# Patient Record
Sex: MALE | Race: WHITE | NOT HISPANIC OR LATINO | Employment: OTHER | URBAN - METROPOLITAN AREA
[De-identification: names, ages, dates, MRNs, and addresses within clinical notes are randomized per-mention and may not be internally consistent; named-entity substitution may affect disease eponyms.]

---

## 2022-07-29 ENCOUNTER — OFFICE VISIT (OUTPATIENT)
Dept: FAMILY MEDICINE CLINIC | Facility: CLINIC | Age: 27
End: 2022-07-29

## 2022-07-29 VITALS
HEART RATE: 60 BPM | BODY MASS INDEX: 38.34 KG/M2 | HEIGHT: 68 IN | RESPIRATION RATE: 22 BRPM | DIASTOLIC BLOOD PRESSURE: 84 MMHG | SYSTOLIC BLOOD PRESSURE: 130 MMHG | WEIGHT: 253 LBS | OXYGEN SATURATION: 96 % | TEMPERATURE: 98 F

## 2022-07-29 DIAGNOSIS — S46.919A MUSCLE STRAIN OF SHOULDER REGION, UNSPECIFIED LATERALITY, INITIAL ENCOUNTER: ICD-10-CM

## 2022-07-29 DIAGNOSIS — R41.89 BRAIN FOG: ICD-10-CM

## 2022-07-29 DIAGNOSIS — M54.2 CERVICAL PAIN (NECK): ICD-10-CM

## 2022-07-29 DIAGNOSIS — Z87.828 HISTORY OF MOTOR VEHICLE ACCIDENT: ICD-10-CM

## 2022-07-29 DIAGNOSIS — R41.3 MEMORY CHANGES: ICD-10-CM

## 2022-07-29 DIAGNOSIS — Z00.00 ENCOUNTER FOR MEDICAL EXAMINATION TO ESTABLISH CARE: Primary | ICD-10-CM

## 2022-07-29 PROCEDURE — 99385 PREV VISIT NEW AGE 18-39: CPT | Performed by: NURSE PRACTITIONER

## 2022-07-29 RX ORDER — BACLOFEN 10 MG/1
10 TABLET ORAL NIGHTLY PRN
Qty: 30 TABLET | Refills: 1 | Status: SHIPPED | OUTPATIENT
Start: 2022-07-29 | End: 2022-10-18

## 2022-07-29 NOTE — ASSESSMENT & PLAN NOTE
Discussed healthy diet low in carbohydrates, fat, cholesterol and sugar and exercise at least 3-4 times weekly for 30 minutes
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
## 2022-08-01 ENCOUNTER — TELEPHONE (OUTPATIENT)
Dept: FAMILY MEDICINE CLINIC | Facility: CLINIC | Age: 27
End: 2022-08-01

## 2022-08-01 NOTE — TELEPHONE ENCOUNTER
----- Message from HANDY Steinberg sent at 7/29/2022  6:08 PM EDT -----  X-ray of cervical spine only showing mild cervicothoracic scoliosis. We can refer to physical therapy if patient is agreeable I will send referral.

## 2022-08-01 NOTE — TELEPHONE ENCOUNTER
Spoke with the patient and advised X-ray of cervical spine only showing mild cervicothoracic scoliosis. We can refer to physical therapy if patient is agreeable I will send referral.      Pt verbalizes understanding and would like to proceed with the Pt referral.

## 2022-08-02 DIAGNOSIS — Z87.828 HISTORY OF MOTOR VEHICLE ACCIDENT: ICD-10-CM

## 2022-08-02 DIAGNOSIS — S46.919A MUSCLE STRAIN OF SHOULDER REGION, UNSPECIFIED LATERALITY, INITIAL ENCOUNTER: ICD-10-CM

## 2022-08-02 DIAGNOSIS — M54.2 CERVICAL PAIN (NECK): Primary | ICD-10-CM

## 2022-08-09 ENCOUNTER — TREATMENT (OUTPATIENT)
Dept: PHYSICAL THERAPY | Facility: CLINIC | Age: 27
End: 2022-08-09

## 2022-08-09 DIAGNOSIS — M54.2 PAIN, NECK: Primary | ICD-10-CM

## 2022-08-09 DIAGNOSIS — G44.86 CERVICOGENIC HEADACHE: ICD-10-CM

## 2022-08-09 PROCEDURE — 97140 MANUAL THERAPY 1/> REGIONS: CPT | Performed by: PHYSICAL THERAPIST

## 2022-08-09 PROCEDURE — 97110 THERAPEUTIC EXERCISES: CPT | Performed by: PHYSICAL THERAPIST

## 2022-08-09 PROCEDURE — 97161 PT EVAL LOW COMPLEX 20 MIN: CPT | Performed by: PHYSICAL THERAPIST
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
## 2022-08-12 ENCOUNTER — LAB (OUTPATIENT)
Dept: LAB | Facility: HOSPITAL | Age: 27
End: 2022-08-12

## 2022-08-12 ENCOUNTER — OFFICE VISIT (OUTPATIENT)
Dept: NEUROLOGY | Facility: CLINIC | Age: 27
End: 2022-08-12

## 2022-08-12 VITALS
DIASTOLIC BLOOD PRESSURE: 76 MMHG | HEART RATE: 62 BPM | SYSTOLIC BLOOD PRESSURE: 130 MMHG | OXYGEN SATURATION: 96 % | RESPIRATION RATE: 18 BRPM

## 2022-08-12 DIAGNOSIS — F07.81 POST CONCUSSION SYNDROME: ICD-10-CM

## 2022-08-12 DIAGNOSIS — G44.89 OTHER HEADACHE SYNDROME: Primary | ICD-10-CM

## 2022-08-12 DIAGNOSIS — R41.3 MEMORY LOSS: ICD-10-CM

## 2022-08-12 DIAGNOSIS — M54.2 NECK PAIN: ICD-10-CM

## 2022-08-12 LAB
ALBUMIN SERPL-MCNC: 4.6 G/DL (ref 3.5–5.2)
ALBUMIN/GLOB SERPL: 1.5 G/DL
ALP SERPL-CCNC: 84 U/L (ref 39–117)
ALT SERPL W P-5'-P-CCNC: 24 U/L (ref 1–41)
ANION GAP SERPL CALCULATED.3IONS-SCNC: 11.1 MMOL/L (ref 5–15)
AST SERPL-CCNC: 23 U/L (ref 1–40)
BASOPHILS # BLD AUTO: 0.04 10*3/MM3 (ref 0–0.2)
BASOPHILS NFR BLD AUTO: 0.5 % (ref 0–1.5)
BILIRUB SERPL-MCNC: 0.3 MG/DL (ref 0–1.2)
BUN SERPL-MCNC: 14 MG/DL (ref 6–20)
BUN/CREAT SERPL: 13.5 (ref 7–25)
CALCIUM SPEC-SCNC: 9.9 MG/DL (ref 8.6–10.5)
CHLORIDE SERPL-SCNC: 104 MMOL/L (ref 98–107)
CO2 SERPL-SCNC: 24.9 MMOL/L (ref 22–29)
CREAT SERPL-MCNC: 1.04 MG/DL (ref 0.76–1.27)
DEPRECATED RDW RBC AUTO: 39.6 FL (ref 37–54)
EGFRCR SERPLBLD CKD-EPI 2021: 100.3 ML/MIN/1.73
EOSINOPHIL # BLD AUTO: 0.2 10*3/MM3 (ref 0–0.4)
EOSINOPHIL NFR BLD AUTO: 2.4 % (ref 0.3–6.2)
ERYTHROCYTE [DISTWIDTH] IN BLOOD BY AUTOMATED COUNT: 12.4 % (ref 12.3–15.4)
FOLATE SERPL-MCNC: 6.8 NG/ML (ref 4.78–24.2)
GLOBULIN UR ELPH-MCNC: 3 GM/DL
GLUCOSE SERPL-MCNC: 85 MG/DL (ref 65–99)
HCT VFR BLD AUTO: 41.9 % (ref 37.5–51)
HGB BLD-MCNC: 14.7 G/DL (ref 13–17.7)
IMM GRANULOCYTES # BLD AUTO: 0.02 10*3/MM3 (ref 0–0.05)
IMM GRANULOCYTES NFR BLD AUTO: 0.2 % (ref 0–0.5)
LYMPHOCYTES # BLD AUTO: 3.74 10*3/MM3 (ref 0.7–3.1)
LYMPHOCYTES NFR BLD AUTO: 44.8 % (ref 19.6–45.3)
MCH RBC QN AUTO: 31.1 PG (ref 26.6–33)
MCHC RBC AUTO-ENTMCNC: 35.1 G/DL (ref 31.5–35.7)
MCV RBC AUTO: 88.8 FL (ref 79–97)
MONOCYTES # BLD AUTO: 0.63 10*3/MM3 (ref 0.1–0.9)
MONOCYTES NFR BLD AUTO: 7.6 % (ref 5–12)
NEUTROPHILS NFR BLD AUTO: 3.71 10*3/MM3 (ref 1.7–7)
NEUTROPHILS NFR BLD AUTO: 44.5 % (ref 42.7–76)
NRBC BLD AUTO-RTO: 0 /100 WBC (ref 0–0.2)
PLATELET # BLD AUTO: 218 10*3/MM3 (ref 140–450)
PMV BLD AUTO: 11.7 FL (ref 6–12)
POTASSIUM SERPL-SCNC: 4.2 MMOL/L (ref 3.5–5.2)
PROT SERPL-MCNC: 7.6 G/DL (ref 6–8.5)
RBC # BLD AUTO: 4.72 10*6/MM3 (ref 4.14–5.8)
SODIUM SERPL-SCNC: 140 MMOL/L (ref 136–145)
TSH SERPL DL<=0.05 MIU/L-ACNC: 1.33 UIU/ML (ref 0.27–4.2)
VIT B12 BLD-MCNC: 292 PG/ML (ref 211–946)
WBC NRBC COR # BLD: 8.34 10*3/MM3 (ref 3.4–10.8)

## 2022-08-12 PROCEDURE — 80053 COMPREHEN METABOLIC PANEL: CPT | Performed by: PHYSICIAN ASSISTANT

## 2022-08-12 PROCEDURE — 84443 ASSAY THYROID STIM HORMONE: CPT | Performed by: PHYSICIAN ASSISTANT

## 2022-08-12 PROCEDURE — 36415 COLL VENOUS BLD VENIPUNCTURE: CPT | Performed by: PHYSICIAN ASSISTANT

## 2022-08-12 PROCEDURE — 99204 OFFICE O/P NEW MOD 45 MIN: CPT | Performed by: PHYSICIAN ASSISTANT

## 2022-08-12 PROCEDURE — 82746 ASSAY OF FOLIC ACID SERUM: CPT | Performed by: PHYSICIAN ASSISTANT

## 2022-08-12 PROCEDURE — 82607 VITAMIN B-12: CPT | Performed by: PHYSICIAN ASSISTANT

## 2022-08-12 PROCEDURE — 85025 COMPLETE CBC W/AUTO DIFF WBC: CPT | Performed by: PHYSICIAN ASSISTANT

## 2022-08-12 RX ORDER — AMITRIPTYLINE HYDROCHLORIDE 10 MG/1
10 TABLET, FILM COATED ORAL NIGHTLY
Qty: 30 TABLET | Refills: 11 | Status: SHIPPED | OUTPATIENT
Start: 2022-08-12
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
## 2022-08-18 ENCOUNTER — TREATMENT (OUTPATIENT)
Dept: PHYSICAL THERAPY | Facility: CLINIC | Age: 27
End: 2022-08-18

## 2022-08-18 DIAGNOSIS — G44.86 CERVICOGENIC HEADACHE: ICD-10-CM

## 2022-08-18 DIAGNOSIS — M54.2 PAIN, NECK: Primary | ICD-10-CM

## 2022-08-18 PROCEDURE — 97112 NEUROMUSCULAR REEDUCATION: CPT | Performed by: PHYSICAL THERAPIST

## 2022-08-18 PROCEDURE — 97110 THERAPEUTIC EXERCISES: CPT | Performed by: PHYSICAL THERAPIST

## 2022-08-18 PROCEDURE — 97530 THERAPEUTIC ACTIVITIES: CPT | Performed by: PHYSICAL THERAPIST

## 2022-08-18 PROCEDURE — 97140 MANUAL THERAPY 1/> REGIONS: CPT | Performed by: PHYSICAL THERAPIST

## 2022-08-18 NOTE — PROGRESS NOTES
Physical Therapy Daily Progress Note    Patient: Darshan Benitez   : 1995  Diagnosis/ICD-10 Code:  Pain, neck [M54.2]  Referring practitioner: No ref. provider found  Date of Initial Visit: Type: THERAPY  Noted: 2022  Today's Date: 2022  Patient seen for 2 sessions         Darshan Benitez reports that he has noticed less intensity of pain since starting physical therapy 2 weeks ago.  Notices improvement in neck mobility as well.  Sleep has been improving as well.  Still has numbness in the right arm and hand.  Headaches are still the same and occur along the right side of the scalp into the right eye region.    Subjective     Objective   See Exercise, Manual, and Modality Logs for complete treatment.       Assessment/Plan  Focused visit on reducing cervical spine stiffness via manual therapy and exercise.  Combined with improving scapular mm endurance and R GHJ flexion dynamic control.    *Updated HEP with written and verbal instruction (included in total time billed as TE below).         Manual Therapy:    15     mins  58963;  Therapeutic Exercise:    15     mins  84806;     Neuromuscular Haily:    9    mins  82678;    Therapeutic Activity:     9     mins  04790;     Gait Training:           mins  18684;     Ultrasound:          mins  28411;    Electrical Stimulation:         mins  40468 ( );  Dry Needling          mins self-pay    Timed Treatment:   48   mins   Total Treatment:     48   mins    Luis Felipe Dickson PT  Physical Therapist

## 2022-08-26 ENCOUNTER — TREATMENT (OUTPATIENT)
Dept: PHYSICAL THERAPY | Facility: CLINIC | Age: 27
End: 2022-08-26

## 2022-08-26 DIAGNOSIS — G44.86 CERVICOGENIC HEADACHE: ICD-10-CM

## 2022-08-26 DIAGNOSIS — M54.2 PAIN, NECK: Primary | ICD-10-CM

## 2022-08-26 PROCEDURE — 97530 THERAPEUTIC ACTIVITIES: CPT | Performed by: PHYSICAL THERAPIST

## 2022-08-26 PROCEDURE — 97110 THERAPEUTIC EXERCISES: CPT | Performed by: PHYSICAL THERAPIST

## 2022-08-26 PROCEDURE — 97140 MANUAL THERAPY 1/> REGIONS: CPT | Performed by: PHYSICAL THERAPIST

## 2022-08-26 NOTE — PROGRESS NOTES
Physical Therapy Daily Progress Note    Patient: Darshan Benitez   : 1995  Diagnosis/ICD-10 Code:  Pain, neck [M54.2]  Referring practitioner: KEVAN Steinberg*  Date of Initial Visit: Type: THERAPY  Noted: 2022  Today's Date: 2022  Patient seen for 3 sessions         Darshan Benitez reports feeling less sensitivity and stiffness in the neck region since last visit.  Home exercises are getting easier.      Subjective     Objective   See Exercise, Manual, and Modality Logs for complete treatment.       Assessment/Plan  Focused visit on using manual therapy to reduce cervical spine stiffness, mainly targeted at the mid C/S and T/S regions.  Combined with exercises to improve C/S and trunk strength.  Will f/u with Mr. Benitez in 2 weeks for re-assessment.    *Updated HEP with written and verbal instruction (included in total time billed as TE below).         Manual Therapy:    15     mins  71973;  Therapeutic Exercise:    24     mins  51243;     Neuromuscular Haily:        mins  72944;    Therapeutic Activity:     9     mins  14246;     Gait Training:           mins  34454;     Ultrasound:          mins  09522;    Electrical Stimulation:         mins  21738 ( );  Dry Needling          mins self-pay    Timed Treatment:   48   mins   Total Treatment:     48   mins    Luis Felipe Dickson PT  Physical Therapist

## 2022-09-07 ENCOUNTER — TREATMENT (OUTPATIENT)
Dept: PHYSICAL THERAPY | Facility: CLINIC | Age: 27
End: 2022-09-07

## 2022-09-07 DIAGNOSIS — G44.86 CERVICOGENIC HEADACHE: ICD-10-CM

## 2022-09-07 DIAGNOSIS — M54.2 PAIN, NECK: Primary | ICD-10-CM

## 2022-09-07 PROCEDURE — 97112 NEUROMUSCULAR REEDUCATION: CPT | Performed by: PHYSICAL THERAPIST

## 2022-09-07 PROCEDURE — 97110 THERAPEUTIC EXERCISES: CPT | Performed by: PHYSICAL THERAPIST

## 2022-09-07 PROCEDURE — 97140 MANUAL THERAPY 1/> REGIONS: CPT | Performed by: PHYSICAL THERAPIST
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
## 2022-09-16 ENCOUNTER — TREATMENT (OUTPATIENT)
Dept: PHYSICAL THERAPY | Facility: CLINIC | Age: 27
End: 2022-09-16

## 2022-09-16 DIAGNOSIS — G44.86 CERVICOGENIC HEADACHE: ICD-10-CM

## 2022-09-16 DIAGNOSIS — M54.2 PAIN, NECK: Primary | ICD-10-CM

## 2022-09-16 PROCEDURE — 97110 THERAPEUTIC EXERCISES: CPT | Performed by: PHYSICAL THERAPIST

## 2022-09-16 PROCEDURE — 97140 MANUAL THERAPY 1/> REGIONS: CPT | Performed by: PHYSICAL THERAPIST

## 2022-09-16 PROCEDURE — 97112 NEUROMUSCULAR REEDUCATION: CPT | Performed by: PHYSICAL THERAPIST

## 2022-09-16 NOTE — PROGRESS NOTES
Physical Therapy Daily Progress Note    Patient: Darshan Benitez   : 1995  Diagnosis/ICD-10 Code:  Pain, neck [M54.2]  Referring practitioner: KEVAN Steinberg*  Date of Initial Visit: Type: THERAPY  Noted: 2022  Today's Date: 2022  Patient seen for 5 sessions         Darshan Benitez reports less dizziness with exercises from last visit.  States that he has had increased stiffness along the right side of the neck region.  Denies right arm pain, numbness and/or tingling.    Subjective     Objective   See Exercise, Manual, and Modality Logs for complete treatment.       Assessment/Plan  Focused visit on reducing right rib/thoracic and cervical region stiffness with manual therapy.  Combined with improving dynamic control of the C/S and VOR.  And, improving scapular mm endurance.    *Updated HEP with written and verbal instruction (included in total time billed as TE below).3       Manual Therapy:    10     mins  74097;  Therapeutic Exercise:    15     mins  15726;     Neuromuscular Haily:    10    mins  38005;    Therapeutic Activity:          mins  82962;     Gait Training:           mins  42358;     Ultrasound:          mins  65803;    Electrical Stimulation:        mins  44574 ( );  Dry Needling          mins self-pay    Timed Treatment:   35   mins   Total Treatment:     40   mins    Luis Felipe Dickson PT  Physical Therapist

## 2022-09-19 ENCOUNTER — HOSPITAL ENCOUNTER (OUTPATIENT)
Dept: MRI IMAGING | Facility: HOSPITAL | Age: 27
Discharge: HOME | End: 2022-09-19

## 2022-09-19 DIAGNOSIS — G44.89 OTHER HEADACHE SYNDROME: ICD-10-CM

## 2022-09-19 DIAGNOSIS — F07.81 POST CONCUSSION SYNDROME: ICD-10-CM

## 2022-09-19 DIAGNOSIS — M54.2 NECK PAIN: ICD-10-CM

## 2022-09-19 DIAGNOSIS — R41.3 MEMORY LOSS: ICD-10-CM

## 2022-09-19 PROCEDURE — 70551 MRI BRAIN STEM W/O DYE: CPT

## 2022-09-19 PROCEDURE — 72141 MRI NECK SPINE W/O DYE: CPT

## 2022-09-23 ENCOUNTER — TREATMENT (OUTPATIENT)
Dept: PHYSICAL THERAPY | Facility: CLINIC | Age: 27
End: 2022-09-23

## 2022-09-23 DIAGNOSIS — G44.86 CERVICOGENIC HEADACHE: ICD-10-CM

## 2022-09-23 DIAGNOSIS — R42 DIZZINESS: ICD-10-CM

## 2022-09-23 DIAGNOSIS — M54.2 PAIN, NECK: Primary | ICD-10-CM

## 2022-09-23 PROCEDURE — 97530 THERAPEUTIC ACTIVITIES: CPT | Performed by: PHYSICAL THERAPIST

## 2022-09-23 PROCEDURE — 97110 THERAPEUTIC EXERCISES: CPT | Performed by: PHYSICAL THERAPIST

## 2022-09-23 PROCEDURE — 97140 MANUAL THERAPY 1/> REGIONS: CPT | Performed by: PHYSICAL THERAPIST

## 2022-09-23 PROCEDURE — 97112 NEUROMUSCULAR REEDUCATION: CPT | Performed by: PHYSICAL THERAPIST

## 2022-09-23 NOTE — PROGRESS NOTES
Physical Therapy Daily Progress Note    Patient: Darshan Benitez   : 1995  Diagnosis/ICD-10 Code:  Pain, neck [M54.2]  Referring practitioner: KEVAN Steinberg*  Date of Initial Visit: Type: THERAPY  Noted: 2022  Today's Date: 2022  Patient seen for 6 sessions         Darshan Benitez reports that he has less dizziness, headaches and improved neck mobility.  Feels like his chest and shoulders are more opened up.  Especially since going to the gym.      Subjective     Objective   See Exercise, Manual, and Modality Logs for complete treatment.       Assessment/Plan  Continued emphasis on improving cervical spine and thoracic spine mobility via manual techniques.  Combined with improving dynamic control of the cervical spine and improving scapular mm activity/endurance.  Will f/u with Mr. Benitez next week.           Manual Therapy:    10     mins  73328;  Therapeutic Exercise:    9     mins  71265;     Neuromuscular Haily:    9    mins  86713;    Therapeutic Activity:     10     mins  83094;     Gait Training:           mins  85673;     Ultrasound:          mins  12817;    Electrical Stimulation:         mins  33281 ( );  Dry Needling         mins self-pay    Timed Treatment:   38   mins   Total Treatment:     38   mins    Luis Felipe Dickson, PT  Physical Therapist

## 2022-10-14 ENCOUNTER — TREATMENT (OUTPATIENT)
Dept: PHYSICAL THERAPY | Facility: CLINIC | Age: 27
End: 2022-10-14

## 2022-10-14 DIAGNOSIS — M54.2 PAIN, NECK: Primary | ICD-10-CM

## 2022-10-14 DIAGNOSIS — R42 DIZZINESS: ICD-10-CM

## 2022-10-14 DIAGNOSIS — G44.86 CERVICOGENIC HEADACHE: ICD-10-CM

## 2022-10-14 PROCEDURE — 97112 NEUROMUSCULAR REEDUCATION: CPT | Performed by: PHYSICAL THERAPIST

## 2022-10-14 PROCEDURE — 97140 MANUAL THERAPY 1/> REGIONS: CPT | Performed by: PHYSICAL THERAPIST

## 2022-10-14 PROCEDURE — 97110 THERAPEUTIC EXERCISES: CPT | Performed by: PHYSICAL THERAPIST
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
## 2022-10-18 ENCOUNTER — OFFICE VISIT (OUTPATIENT)
Dept: FAMILY MEDICINE CLINIC | Facility: CLINIC | Age: 27
End: 2022-10-18

## 2022-10-18 VITALS
DIASTOLIC BLOOD PRESSURE: 68 MMHG | OXYGEN SATURATION: 98 % | SYSTOLIC BLOOD PRESSURE: 120 MMHG | BODY MASS INDEX: 38.31 KG/M2 | TEMPERATURE: 97.8 F | HEIGHT: 68 IN | HEART RATE: 77 BPM | WEIGHT: 252.8 LBS

## 2022-10-18 DIAGNOSIS — Z87.828 HISTORY OF MOTOR VEHICLE ACCIDENT: Primary | ICD-10-CM

## 2022-10-18 PROCEDURE — 99213 OFFICE O/P EST LOW 20 MIN: CPT | Performed by: STUDENT IN AN ORGANIZED HEALTH CARE EDUCATION/TRAINING PROGRAM

## 2022-10-28 ENCOUNTER — TREATMENT (OUTPATIENT)
Dept: PHYSICAL THERAPY | Facility: CLINIC | Age: 27
End: 2022-10-28

## 2022-10-28 DIAGNOSIS — R42 DIZZINESS: ICD-10-CM

## 2022-10-28 DIAGNOSIS — G44.86 CERVICOGENIC HEADACHE: ICD-10-CM

## 2022-10-28 DIAGNOSIS — M54.2 PAIN, NECK: Primary | ICD-10-CM

## 2022-10-28 PROCEDURE — 97110 THERAPEUTIC EXERCISES: CPT | Performed by: PHYSICAL THERAPIST

## 2022-10-28 PROCEDURE — 97140 MANUAL THERAPY 1/> REGIONS: CPT | Performed by: PHYSICAL THERAPIST

## 2022-10-28 PROCEDURE — 97112 NEUROMUSCULAR REEDUCATION: CPT | Performed by: PHYSICAL THERAPIST

## 2022-10-28 PROCEDURE — 97530 THERAPEUTIC ACTIVITIES: CPT | Performed by: PHYSICAL THERAPIST

## 2022-10-28 NOTE — PROGRESS NOTES
Physical Therapy Daily Progress Note    Patient: Darshan Benitez   : 1995  Diagnosis/ICD-10 Code:  Pain, neck [M54.2]  Referring practitioner: KEVAN Steinberg*  Date of Initial Visit: Type: THERAPY  Noted: 2022  Today's Date: 10/28/2022  Patient seen for 8 sessions         Darshan Benitez reports improvement in neck pain, headaches and dizziness.  Is having some occasional dizziness throughout the day, but not severe.  Still going to [employer]  Working on trying to get some PT approved for dizziness/vestibular rehabilitation.  Saw his PCP recently and was unremarkable for anything to be addressed.      Subjective     Objective   See Exercise, Manual, and Modality Logs for complete treatment.       Assessment/Plan  Focused visit on improving upper cervical spine mobility via manual therapy.  Combined with improving cervical spine and scapular mm endurance.  Last, worked on improving VOR efficiency via fixed gaze exercises.  Will have Mr. Benitez email me in 2 weeks to provide an update.  If he needs more vestibular physical therapy, I will be happy to see him back in the clinic.    *Updated HEP with written and verbal instruction (included in total time billed as TE below).         Manual Therapy:    9     mins  08858;  Therapeutic Exercise:    10     mins  11959;     Neuromuscular Haily:    9    mins  32828;    Therapeutic Activity:     10     mins  65316;     Gait Training:           mins  27572;     Ultrasound:          mins  16691;    Electrical Stimulation:         mins  45098 ( );  Dry Needling          mins self-pay    Timed Treatment:  38    mins   Total Treatment:    38    mins    Luis Felipe Dickson, PT  Physical Therapist

## 2022-11-11 ENCOUNTER — TELEPHONE (OUTPATIENT)
Dept: NEUROLOGY | Facility: CLINIC | Age: 27
End: 2022-11-11

## 2022-11-11 DIAGNOSIS — F07.81 POST CONCUSSION SYNDROME: ICD-10-CM

## 2022-11-11 DIAGNOSIS — M54.2 NECK PAIN: ICD-10-CM

## 2022-11-11 DIAGNOSIS — G44.89 OTHER HEADACHE SYNDROME: Primary | ICD-10-CM

## 2022-11-11 NOTE — TELEPHONE ENCOUNTER
Caller: Darshan Benitez    Relationship: Self    Best call back number: [phone]    What is the medical concern/diagnosis: DIZZINESS    What specialty or service is being requested: VESTIBULAR THERAPY    What is the provider, practice or medical service name: [facility]    What is the office location: [address] [place] [address]    What is the office phone number: [phone]    Any additional details: PT STATES HE HAS FINISHED HIS PHYSICAL THERAPY WITH  [facility], HOWEVER, PT STATES IT WAS RECOMMENDED BECKY HENRIQUEZ PA-C REFER HIM TO [facility] FOR VESTIBULAR THERAPY AS HE CONTINUES TO EXPERIENCE DIZZINESS.    PLEASE REVIEW AND ADVISE.

## 2022-11-17 ENCOUNTER — OFFICE VISIT (OUTPATIENT)
Dept: NEUROLOGY | Facility: CLINIC | Age: 27
End: 2022-11-17

## 2022-11-17 DIAGNOSIS — F07.81 POST CONCUSSION SYNDROME: Primary | ICD-10-CM

## 2022-11-17 PROCEDURE — 99213 OFFICE O/P EST LOW 20 MIN: CPT | Performed by: PHYSICIAN ASSISTANT
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
## 2023-01-13 ENCOUNTER — TELEPHONE (OUTPATIENT)
Dept: NEUROLOGY | Facility: CLINIC | Age: 28
End: 2023-01-13
Payer: COMMERCIAL

## 2023-01-13 NOTE — TELEPHONE ENCOUNTER
THIS WAS A PREVIOUS REQUEST FROM THE PT WHILE UNDER DESMOND HENRIQUEZ. THE PT WASN'T ABLE TO SCHEDULE APPTS DUE TO [facility]ING ACQUIRED BY .    PT IS ASKING THE ORDER BE RESENT AND FAXED [phone] ATTN: DOE    Caller: Darshan Benitez     Relationship: Self     Best call back number: [phone]     What is the medical concern/diagnosis: DIZZINESS     What specialty or service is being requested: VESTIBULAR THERAPY     What is the provider, practice or medical service name: [facility]     What is the office location: [address] [place] [address]     What is the office phone number: [phone]  [phone] ATTN: DOE     Any additional details: PT STATES HE HAS FINISHED HIS PHYSICAL THERAPY WITH  [facility], HOWEVER, PT STATES IT WAS RECOMMENDED BECKY HENRIQUEZ PA-C REFER HIM TO [facility] FOR VESTIBULAR THERAPY AS HE CONTINUES TO EXPERIENCE DIZZINESS.     PLEASE REVIEW AND ADVISE.

## 2023-01-16 DIAGNOSIS — R42 DIZZINESS: Primary | ICD-10-CM

## 2025-06-26 NOTE — ASSESSMENT & PLAN NOTE
- Patient with improved dizziness, but does intermittently lose his balance and needs to catch himself from falling  - Has been evaluated by neurology and MRI head and MR C-spine were negative  - Recommended taking the amitriptyline daily instead of as needed, recommended follow-up with neurology as they had requested a 3 to 4-month follow-up, which would put him in November/December, patient will call and schedule that appointment  - Continue physical therapy, OT, and speech  - Advised patient to follow-up with us if he is done with therapy and still does not feel back to baseline  
Detail Level: Simple
Instructions: This plan will send the code FBSE to the PM system.  DO NOT or CHANGE the price.
Price (Do Not Change): 0.00